# Patient Record
(demographics unavailable — no encounter records)

---

## 2024-10-09 NOTE — PHYSICAL EXAM
[Person] : oriented to person [Place] : oriented to place [Time] : oriented to time [Cranial Nerves Optic (II)] : visual acuity intact bilaterally,  visual fields full to confrontation, pupils equal round and reactive to light [Cranial Nerves Oculomotor (III)] : extraocular motion intact [Cranial Nerves Trigeminal (V)] : facial sensation intact symmetrically [Cranial Nerves Facial (VII)] : face symmetrical [Cranial Nerves Vestibulocochlear (VIII)] : hearing was intact bilaterally [Cranial Nerves Glossopharyngeal (IX)] : tongue and palate midline [Cranial Nerves Accessory (XI - Cranial And Spinal)] : head turning and shoulder shrug symmetric [Cranial Nerves Hypoglossal (XII)] : there was no tongue deviation with protrusion [Motor Tone] : muscle tone was normal in all four extremities [Motor Strength] : muscle strength was normal in all four extremities [No Muscle Atrophy] : normal bulk in all four extremities [Sensation Tactile Decrease] : light touch was intact [1+] : Patella left 1+ [0] : Ankle jerk left 0 [FreeTextEntry8] : Wide based

## 2024-10-09 NOTE — ASSESSMENT
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  MAG neuropathy is most associated with DADS subtype and is primarily demyelinating on EMG. I'm not convinced the patient's neuropathy is due to MAG as it is axonal on EMG; more likely, it is due to his drinking history. Additionally, MAG can be difficult to treat and requires immunosuppressant agents like Rituximab. As clinically he is doing very well and his symptoms have not changed in 15 years, I do not think his symptom burden justify an empiric course of immunosuppressant use, especially considering he is already on hormonal therapy for his cancer. I discussed my thoughts with the patient and he is in full agreement.   Return PRN

## 2024-10-09 NOTE — HISTORY OF PRESENT ILLNESS
[FreeTextEntry1] : This is a 66M with PMH alcoholic cirrhosis, prostate cancer with mets to the colon, bladder, and bone on hormonal therapy, and neuropathy who was referred for evaluation.  The patient has been experiencing that the pads of his feet are "swollen" and "sensations" in his feet all the time. Sensation described as squeezing, tightness; not so much pain. Usually, his symptoms don't bother him during the day, but when he goes to bed at night he notices his symptoms the most. Has been going on for at least 15 years as he recalls that he first noticed something was wrong 15 years ago when he walked outside barefoot in 102 degrees and didn't realize he had burned the bottom of his feet. Symptoms have not progressed in 15 years.   [facility]. There, he had an EMG which showed axonal neuropathy and workup revealed SPEP with M-spike. He was referred to Hem/Onc and MAG IgM was found to be elevated at 3345. He was referred here for further evaluatoin.  He feels that his neuropathy limits his ability to enjoy things that he used to do including playing golf, drinking alcohol, or walking up stairs easily. He denies it interfering with taking care of himself and managing his day to day activities. His functional ability has not changed in 15 years and he considers himself very yulia and blessed to be alive despite stage 4 cancer.  Currently doing PT focusing on balance and walking.

## 2025-04-01 NOTE — ASSESSMENT
[FreeTextEntry1] : Assessment: 66-year-old male with pmh prostate cancer. Independent in adls and needs assistance with most iadls. MMSE 26/30 (improved from last time). Poor recall of words. Good visual spatial skills. Able to draw clock with correct time. Able to write a sentence correctly.         Diagnostic Impression:     -unsteady gait -memory loss -Cancer  -speech disturbance   Plan: -Finished PT in November. Reordered  -Educated on lifestyle modifications such as daily exercise, healthy balanced diet, and good sleep habits

## 2025-04-01 NOTE — PHYSICAL EXAM
[General Appearance - Alert] : alert [Oriented To Time, Place, And Person] : oriented to person, place, and time [Affect] : the affect was normal [Person] : oriented to person [Place] : oriented to place [Time] : oriented to time [Remote Intact] : remote memory intact [Registration Intact] : recent registration memory intact [Span Intact] : the attention span was normal [Concentration Intact] : normal concentrating ability [Visual Intact] : visual attention was ~T not ~L decreased [Naming Objects] : no difficulty naming common objects [Repeating Phrases] : no difficulty repeating a phrase [Writing A Sentence] : no difficulty writing a sentence [Comprehension] : comprehension intact [Reading] : reading intact [Current Events] : adequate knowledge of current events [Past History] : adequate knowledge of personal past history [Vocabulary] : adequate range of vocabulary [Total Score ___ / 30] : the patient achieved a score of [unfilled] /30 [Date / Time ___ / 5] : date / time [unfilled] / 5 [Place ___ / 5] : place [unfilled] / 5 [Registration ___ / 3] : registration [unfilled] / 3 [Serial Sevens ___/5] : serial sevens [unfilled] / 5 [Naming 2 Objects ___ / 2] : naming two objects [unfilled] / 2 [Repeating a Sentence ___ / 1] : repeating a sentence [unfilled] / 1 [Writing a Sentence ___ / 1] : write sentence [unfilled] / 1 [3-stage Verbal Command ___ / 3] : three-stage verbal command [unfilled] / 3 [Written Command ___ / 1] : written command [unfilled] / 1 [Copy a Design ___ / 1] : copy a design [unfilled] / 1 [Recall ___ / 3] : recall [unfilled] / 3 [Cranial Nerves Optic (II)] : visual acuity intact bilaterally,  visual fields full to confrontation, pupils equal round and reactive to light [Cranial Nerves Oculomotor (III)] : extraocular motion intact [Cranial Nerves Trigeminal (V)] : facial sensation intact symmetrically [Cranial Nerves Facial (VII)] : face symmetrical [Cranial Nerves Vestibulocochlear (VIII)] : hearing was intact bilaterally [Cranial Nerves Glossopharyngeal (IX)] : tongue and palate midline [Cranial Nerves Accessory (XI - Cranial And Spinal)] : head turning and shoulder shrug symmetric [Cranial Nerves Hypoglossal (XII)] : there was no tongue deviation with protrusion [Motor Strength Lower Extremities Bilaterally] : there was weakness in both lower extremities [Romberg's Sign] : a positive Romberg's sign was present [Limited Balance] : the patient's balance was impaired [2+] : Ankle jerk left 2+ [Sclera] : the sclera and conjunctiva were normal [PERRL With Normal Accommodation] : pupils were equal in size, round, reactive to light, with normal accommodation [Extraocular Movements] : extraocular movements were intact [Full Visual Field] : full visual field [Short Term Intact] : short term memory impaired [Fluency] : fluency not intact [Tremor] : no tremor present [FreeTextEntry4] : Mental Status Exam   Presidents: 3/5 Alternating Pattern: ok  Spiral: ok Clock: 3/3 Repetition: ok Trail A: B:  Fluency: A: 4 Animals: 21  Go-No-Go:  ok  R/L discrimination on self and examiner: ok  Cross-line commands: ok Praxis: ok - Motor: ok -Dynamic/Luria: ok -Ideomotor/Imitation: ok   -Ideational/writing/closing-in: ok  -Dressing: ok [FreeTextEntry8] : Gait: able to stand with hands crossed and without assistance +1 Flexed posture Slow gait initiation, decreased stride length, normal arm swing, no freezing of gait upon turning, requires multiple steps with turning. Wide based negative pull test.

## 2025-04-01 NOTE — REVIEW OF SYSTEMS
[As Noted in HPI] : as noted in HPI [Anxiety] : anxiety [Memory Lapses or Loss] : memory loss [Leg Weakness] : leg weakness [Poor Coordination] : poor coordination [Difficulties in Speech] : speech difficulties [Numbness] : numbness [Tingling] : tingling [Difficulty Walking] : difficulty walking [Frequent Falls] : frequent falls [Incontinence] : incontinence [Joint Pain] : joint pain [Negative] : Heme/Lymph [Confused or Disoriented] : no confusion [Decr. Concentrating Ability] : no decrease in concentrating ability [Difficulty with Language] : no ~M difficulty with language [Changed Thought Patterns] : no change in thought patterns [Repeating Questions] : no repeated questioning about recent events [Facial Weakness] : no facial weakness [Arm Weakness] : no arm weakness [Hand Weakness] : no hand weakness [Difficulty Writing] : no difficulty writing [Abnormal Sensation] : no abnormal sensation [Hypersensitivity] : no hypersensitivity [Seizures] : no convulsions [Dizziness] : no dizziness [Fainting] : no fainting [Lightheadedness] : no lightheadedness [Vertigo] : no vertigo [Cluster Headache] : no cluster headache [Migraine Headache] : no migraine headache [Tension Headache] : no tension-type headache [Inability to Walk] : able to walk [Ataxia] : no ataxia [Limping] : not limping

## 2025-04-01 NOTE — HISTORY OF PRESENT ILLNESS
[FreeTextEntry1] : COVID  COVID VACCINE FULL.  HPI interval [id number]: 66-year-old male presents today for follow up visits. Memory is stable. No changes in adls or iadls. Sleep is great. Denies hallucinations or delusions. No recent falls or hospitalizations. Mood is good. He socializes. HHA 6 hours x 2 days. Appetite is ok. Speech is impaired and slow annunciation since cancer.   HPI: 65-year-old male presents today for initial cognitive evaluation. Referral from Kat Harrington NP. He has Prostate Cancer that has metastasized to brain and bone. Receives injections every 3 months for cancer. He was diagnosed with Cancer two years ago. He lives by himself and on weekends he has his girlfriend. He has a hha 3.5 hours x 2 days. He is a . He has liver cirrhosis from heavy drinking. He has not had a drink in over 2 years. He vapes one to two times a night. Speech is impaired and slow annunciation since cancer. He was in ED at [place] and there for 4 weeks in February 2 years ago and that's when diagnosed with cancer and decline began. He had no recent falls or hospitalizations. No family history of Dementia. PT 1x weekly- improved balanced and gait.  PMH: Prostate Cancer (takes pills)   -Memory: forgetful  -Speech: slowed. loud annunciation since cancer diagnosis   -Orientation: ok  -Praxis: ok  -Decision making/Executive fx/Multitasking: ok   -Sleep: weird hours.  -Appetite: ok    -Motor symptoms: difficulty walking, poor balance    -B/B: sometimes  -Psychiatric symptoms: anxiety     -Functional status:   Ramírez Index of [place] in Activities of Daily Livin. Bathing/Showerin   2. Dressin  3. Toiletin   4. Transferrin 5. Continence: 1 6: Feedin  TOTAL: 6       Jak-[place]un[place] Instrumental Activities of Daily Living:  A. Ability to Use Telephone: 1   B. Shoppin C. Food Preparation: 1    D. Housekeepin   E. Laundry: 1 F. Transportation: 0    G. Responsibility for Own Medications: 1  H: Ability to Handle Finances:  1 TOTAL:  7   CDR: n/a  -Professional status:  Contractor   PCP and other physicians:  -PCP: Dr. Black   -Neuro: Kat Harrington NP Workup done: MRI - unremarkable